# Patient Record
Sex: MALE | Race: ASIAN | Employment: FULL TIME | URBAN - METROPOLITAN AREA
[De-identification: names, ages, dates, MRNs, and addresses within clinical notes are randomized per-mention and may not be internally consistent; named-entity substitution may affect disease eponyms.]

---

## 2017-01-10 NOTE — PROGRESS NOTES
CC:   productive cough with green/brown phlegm     HPI:   Pt has productive cough with green or brown phlegm production for the past several days or more. Patient denies any chest pain shortness of breath or wheezing. Low-grade fever off and on.   Pt has

## 2017-02-21 NOTE — PROGRESS NOTES
Lacey Hester is a 35year old male who presents for a complete physical exam.   HPI:   Pt complains of cough and congestion and a lot of sinus drainage over the past several days or more.   He was treated with Z-Brian for similar symptoms in January but he n and PND  CARDIOVASCULAR: denies CP, palpitations, rapid or slow heart rate.  Denies FRAZIER/lower extremity swelling  GI: denies abdominal pain,denies heartburn, denies n/v/c/d/change in stools/blood in stool/black stool/change in appetite  : denies nocturia insight    There is no immunization history for the selected administration types on file for this patient.     ASSESSMENT AND PLAN:   Steph Hayes is a 35year old male who presents for a complete physical exam.     Recommend healthy diet including green

## 2017-02-27 NOTE — TELEPHONE ENCOUNTER
Pt would prefer not to come again as he has already been seen twice, and [place] city. Pt states that he still has the cough and would like to know if there is something, even OTC, he can take for it.

## 2017-02-27 NOTE — TELEPHONE ENCOUNTER
Pt called and made him aware the he will need a chest xray and he needs to start Flovent 2 puffs per day to help with cough. I told him that meds were sent to the pharmacy and to call to schedule an appt later this week or early next week. .Ned Weiner

## 2017-02-27 NOTE — TELEPHONE ENCOUNTER
Would recommend getting a chest x-ray and starting Flovent 2 puffs twice a day which can help with cough. Meds sent to pharmacy. Patient should follow-up with me towards the end of this week or beginning of next week.

## 2017-03-10 NOTE — PATIENT INSTRUCTIONS
USE MUCINEX DM TWICE A DAY AS DIRECTED ON PACKAGE. USE SYMBICORT INHALER(80/4.5) :   TAKE 2 INHALATIONS TWICE A DAY. FOLLOW UP IN  1 WEEK.

## 2017-03-12 NOTE — PROGRESS NOTES
HPI:    Patient ID: Janel Vargas is a 35year old male. HPI  Patient is here with complaint of cough sore throat. He has finished 2 courses of antibiotic and try Flovent with minimal relief. No fever or chills.   Review of Systems  Negative except for DC#[id number]

## 2017-03-31 NOTE — PROGRESS NOTES
HPI:    Patient ID: Hari Cano is a 35year old male. HPI  Patient is here with complaint of cough congestion started 2 days ago. Previously he was given Symbicort and did respond well to it.   Review of Systems  Negative except for the above tablet 0      Sig: Take 1 tablet (20 mg total) by mouth 2 (two) times daily. Albuterol Sulfate 108 (90 Base) MCG/ACT Inhalation Aerosol Powder, Breath Activated 1 each 0      Sig: Inhale 2 puffs into the lungs every 4 to 6 hours as needed.            I

## 2017-05-06 NOTE — TELEPHONE ENCOUNTER
Patient is requesting vanos cream. He is unable to come in during the week. Would like a refill.   Medication pended to PCP

## 2017-05-08 NOTE — TELEPHONE ENCOUNTER
From: Honey Burden  Sent: 5/6/2017 1:07 PM CDT  To: [facility] Clinical Staff  Subject: RE: Prescription Question    Thank you Wittenberg Roscoe. I just got a call from [facility] that insurance is not covering the medication. [facility] has sent the detail to you.  I hav Prescription Question    I had a referral last year but it . I was not able to use the referral as I used the Vanos cream prescribed by Dr. Dina Alvarez. Do you still want me to take an appointment?  Can you please talk to the doctor and have him refer to

## 2017-05-20 NOTE — PROGRESS NOTES
HPI:    Patient ID: Mariia Horan is a 38year old male. HPI  Is a rash which is scaly in both feet which he has had chronically for years off and on. He has used Vanos cream in the past which did help. He did not use anything recently.   Is getting wo plantar aspect of the right foot and on the dorsum and plantar aspect of the left foot         ASSESSMENT/PLAN:   Psoriasis  (primary encounter diagnosis)  Kidney stone  Cough  Patient counseled at length regarding the above conditions.   We will try clobet

## 2017-06-09 NOTE — TELEPHONE ENCOUNTER
From: Jamelle Frankel  To: Chapis Wayne MD  Sent: 6/3/2017 4:17 PM CDT  Subject: Test Results Question    I have a question about XR ABDOMEN (KUB) (1 AP VIEW) (CPT=74000) resulted on 5/27/17, 1:06 PM.    I was unable to figure out from the report if angel

## 2018-08-21 PROBLEM — N50.819 PAIN OF TESTES: Status: ACTIVE | Noted: 2018-08-21

## 2018-08-21 PROBLEM — R10.30 INGUINAL PAIN: Status: ACTIVE | Noted: 2018-08-21

## 2018-08-21 NOTE — PROGRESS NOTES
HPI:    Patient ID: Lacey Hester is a 37year old male. HPI  Patient is here with complaint of bilateral inguinal pain and also testicular pain over the past several days or more. He states it hurts more when he stands.   He denies any dysuria or penil

## 2018-08-23 NOTE — TELEPHONE ENCOUNTER
Pt wants test results from [address] today, I told him that we do not have them yet and once we do an provider reviews them, the pt will be called

## 2018-08-23 NOTE — TELEPHONE ENCOUNTER
No inguinal hernia is seen. Patient does have what appears to be epididymitis bilateral.  The antibiotic I gave should take care of it. Suggest an infection of the cord like structures behind the testicles. It can happen without any known cause.   [employer]

## 2018-08-24 NOTE — TELEPHONE ENCOUNTER
Patient states he has questions for you and he did not want discuss with the nurse. Patient would like call back when you are available.

## 2018-10-04 NOTE — TELEPHONE ENCOUNTER
Pt would like a meningitis shot for traveling purpose please call pt and inform him how long will it take to get his appt, please advise

## 2018-10-04 NOTE — TELEPHONE ENCOUNTER
Future Appointments   Date Time Provider Leslie Tucker   10/10/2018  1:30 PM [facility] NURSE [facility] [address]

## 2018-10-04 NOTE — TELEPHONE ENCOUNTER
Requesting meningitis vaccine  LOV: 8/21/18  RTC: not noted    No future appointments. No record of receiving this shot. Would like to get for traveling.   Pended for approval [employer]

## 2018-10-10 NOTE — PROGRESS NOTES
IM to left deltoid, patient tolerated well. Waiver of non covered benefit form signed by patient and brought to front for immediate scanning. Dx code/charges documented for this encounter.

## 2018-10-17 NOTE — PROGRESS NOTES
Akil Busby is a 37year old male who presents for a complete physical exam.   HPI:   Pt complains of nothing.   Urination changes no  ED symptoms no  Immunizations needed no  Wt Readings from Last 6 Encounters:  10/17/18 : 164 lb  08/21/18 : 157 lb 8 oz Family History   Problem Relation Age of Onset   • Diabetes Father    • Diabetes Mother       Social History:  Social History    Tobacco Use      Smoking status: Never Smoker      Smokeless tobacco: Never Used    Alcohol use: No    Drug use: No        RE pulses 2+ and symmetric b/l  GI: normoactive BS, non-distended, non-tender to palpation, no HSM/masses/pulsations  MUSCULOSKELETAL: Back with normal AROM, no joint swelling, extremities x 4 with normal strength 5/5 and symmetric and with normal AROM/PROM.

## 2019-01-24 NOTE — PROGRESS NOTES
HPI:   Michelle Alejandra is a 37year old male that presents for cough. Patient states he has had cough for approx. 3 days. He also states he had a low grade temp last night of 99.8. Pt took motrin and tylenol.  He said he cannot sleep cause the cough is sev to auscultation bilterally, no rales/rhonchi/wheezing. ABDOMEN:  Soft, nondistended, nontender, bowel sounds normal in all 4 quadrants, no masses, no hepatosplenomegaly. EXTREMITIES:  No edema, no cyanosis, 2+ radial pulses b/l.    NEURO:  Grossly normal

## 2019-08-01 NOTE — ED PROVIDER NOTES
Patient Seen in: [facility]    History   Patient presents with:  Chest Pain    Stated Complaint: CHEST PAIN X 1 DAY    HPI    This 68-year-old male presents to the office with complaint of left-sided chest pain which started las 6.5\")   Wt 72.6 kg   SpO2 98%   BMI 25.44 kg/m²         Physical Exam    General: WH/WN/WD, in NAD, A and O times 3  HEAD: Normocephalic, atraumatic  EYES: Sclera anicteric,  conjunctiva normal.  EARS: Tympanic membranes normal, EAC's normal.  NOSE: Lexie Leroy MDM     The patient is merely brought back to the room and placed on a cart. EKG is performed. IV access is obtained and laboratory is drawn. I discussed the results of the laboratory, EKG and chest x-ray with the patient and his wife.   He is

## 2019-08-01 NOTE — ED INITIAL ASSESSMENT (HPI)
Patient c/o left sided chest pain started yesterday when bending, lifting something, and with certain movements. Denies short of breath. States recent long drive to [place] July 21, 2019 and back to [place] July 28, 2019.

## 2019-08-01 NOTE — TELEPHONE ENCOUNTER
Pt called in with c/o left chest pain, on and off, experiences it with some movements and when presses his chest. He first noticed it last evening when he bent over to pick something up.  He reports he noticed it last night when changing positions to his si

## 2019-08-01 NOTE — ED NOTES
Pt returned our call. States he has already been worked up by hematologist, found no actionable issue. No further questions at this time.

## 2019-08-01 NOTE — ED NOTES
Phone call placed to pt's phone, message left on voice mail asking pt to please return our call. Return phone number provided.  (Per Dr. Arian Olmos pt should follow up with PCP for iron deficiency anemia / hemoglobinopathy)

## 2019-08-05 NOTE — TELEPHONE ENCOUNTER
Called pt, pt reports he has been taking either Ibuprofen or Tylenol for the fever and it is responding to the medication.  Pt requesting appointment today or asking Dr. Chon Almaguer to fit him in, held pt on phone with questions, but once appointment made, pt ea

## 2019-08-08 NOTE — PROGRESS NOTES
HPI:   Hari Cano is a 53year old male that presents for Patient presents with:  Fever: Pain stated he is having sever throat pain/fever/headache/body ache since yesterday. He has been taking advil/tynelol for his symptoms.         Past medical, surgica no cervical LAD, no thyromegaly. SKIN: No rashes, no skin lesion, no bruising, good turgor. HEART:  Regular rate and rhythm, no murmurs, rubs or gallops. LUNGS: Clear to auscultation bilterally, no rales/rhonchi/wheezing.   ABDOMEN:  Soft, nondistended,

## 2019-11-12 NOTE — TELEPHONE ENCOUNTER
Pt is experiencing pain between his neck and shoulder on the right side. Pt is not sure if he needs an appt but he would like to speak to a nurse.

## 2019-12-04 NOTE — PROGRESS NOTES
Arjun Obregon is a 53year old male who presents with son for a complete physical exam.   HPI:   Urination changes no  ED symptoms no  Immunizations needed: refuses flu vaccine today. The patient complains of excessive ear wax build up in both ears. 25.4 %    Mixed Cell % 10.4 %   D-DIMER (POC)   Result Value Ref Range    D-Dimer DDU <100 <400 ng/mL DDU   EKG 12-LEAD   Result Value Ref Range    Ventricular rate 84 BPM    Atrial rate 84 BPM    P-R Interval 138 ms    QRS Duration 88 ms    Q-T Interval 3 urethra  MUSCULOSKELETAL: denies joint or muscle aches or pains  NEURO: denies headaches/dizziness  PSYCH: denies depression or anxiety  HEMATOLOGIC: denies easy bruising/bleeding/anemia/blood clot disorders  ENDOCRINE: denies weight gain/weight loss/enlar Janel Vargas is a 53year old male who presents for a complete physical exam.     José Gaspar was seen today for physical, imm/inj and ear wax.     Diagnoses and all orders for this visit:    Routine general medical examination at a health care facility  - Signed: Bouchra Aponte, 12/4/2019, 3:24 PM.      I, Cordell Lara MD,  personally performed the services described in this documentation. All medical record entries made by the scribe were at my direction and in my presence.   I have reviewed the tony

## 2019-12-12 NOTE — TELEPHONE ENCOUNTER
Informed pt of Dr. Elizabeth Dietz update, advised pt the form was complete, only needs Dr. Elizabeth Dietz signature then can be faxed to fax number on phone. Advised can be done tomorrow morning, nurse will call pt when signed and faxed with confirmation received.  Pt

## 2019-12-13 NOTE — TELEPHONE ENCOUNTER
Form reviewed and signed per Dr. Lemuel Saenz, faxed to number provided on form with confirmation received. Pt informed, pt expressed understanding and agreement, form and copy of confirmation at  ready for . Sent copy to scan. Task completed.

## 2019-12-22 PROBLEM — H61.23 BILATERAL IMPACTED CERUMEN: Status: ACTIVE | Noted: 2019-12-22

## 2019-12-22 NOTE — PROGRESS NOTES
HPI:   Carlos Menendez is a 53year old male that presents for Patient presents with:  Lab Results  Ear Wax: Kevin ear wax- Patient is still having discomfort on both ears Has been trying ear wax/hydrogen peroxide ear drops over the counter.          Past medic of both feet extensive. HEART:  Regular rate and rhythm, no murmurs, rubs or gallops. LUNGS: Clear to auscultation bilterally, no rales/rhonchi/wheezing.   ABDOMEN:  Soft, nondistended, nontender, bowel sounds normal in all 4 quadrants, no masses, no hepa

## 2020-11-16 PROBLEM — N50.819 PAIN OF TESTES: Status: RESOLVED | Noted: 2018-08-21 | Resolved: 2020-11-16

## 2020-11-16 PROBLEM — R10.30 INGUINAL PAIN: Status: RESOLVED | Noted: 2018-08-21 | Resolved: 2020-11-16

## 2020-11-16 NOTE — PROGRESS NOTES
Maribeth Walters is a 50year old male who presents for a complete physical exam.   HPI:   Pt complains of nothing.   Urination changes no  ED symptoms no  Immunizations needed no  Wt Readings from Last 6 Encounters:  11/16/20 : 180 lb 4 oz (81.8 kg)  12/20/19 anisopoikilocytosis. Adequate number of neutrophils and platelets. Reviewed by Jordan Mays M.D.  Pathology 12/09/19 at 2:55 PM       CBC W/ DIFFERENTIAL   Result Value Ref Range    WBC 8.5 4.0 - 11.0 x10(3) uL    RBC 6.94 (H) 4.30 - 5.70 x10(6)uL    H unusual skin lesions  EYES: denies changes in vision  HENT: denies upper respiratory symptoms  LUNGS: denies RENETTA, wheezing, cough, orthopnea and PND  CARDIOVASCULAR: denies CP, palpitations, rapid or slow heart rate.  Denies FRAZIER/lower extremity swelling  GI biceps and patellar 2+ b/l and symmetric.  Gait is normal.  PSYCH: normal affect, no apparent thought disorder, average judgement and insight      Immunization History  Administered            Date(s) Administered    Fluvirin, 3 Years & >, Im

## 2020-11-19 NOTE — TELEPHONE ENCOUNTER
Patient came into the office with a phyiscal for from [address] [place] [address], wants it faxed when it's complete & a phone call.

## 2020-11-23 NOTE — TELEPHONE ENCOUNTER
From: Esther Landis  To: Raquel Ortiz MD  Sent: 11/23/2020 1:23 PM CST  Subject: Other    Hello,    Did you get a chance to fax the form?     Thanks,  Caprice Morillo

## 2020-11-24 NOTE — TELEPHONE ENCOUNTER
Pt called asking the status. Form found in MA folder. Needed waist circumference. Per Francis Castaneda, pt can come in and we can take his measurement. Pt came around noon and measurement was taken, form was signed and faxed.     Fax confirmed, copy given to pt,

## 2021-03-25 NOTE — PROGRESS NOTES
HPI:   Janel Vargas is a 50year old male that presents for   Patient presents with:  Knee Pain: Right knee pain x few months, no pain in left knee. Sitting for long periods-unable to straighten leg. No previous injury, no trauma.  No imaging    Patient st tenderness gait is normal  EXTREMITIES:  No edema, no cyanosis, 2+ radial pulses b/l. NEURO:  Grossly normal     ASSESSMENT AND PLAN:      1. Acute pain of right knee  - XR KNEE (1 OR 2 VIEWS), RIGHT (CPT=73560);  Future    I would recommend x-ray of knee

## 2021-12-12 NOTE — PROGRESS NOTES
Leone Scheuermann is a 48year old male who presents for a complete physical exam.   HPI:   Patient presents with:  Physical: PHQ2: 0, CSSR: Neg   Immunization/Injection: declined flu vaccine today  Other: due for colonoscopy -> referral pended      Urination PND  CARDIOVASCULAR: denies CP, palpitations, rapid or slow heart rate.  Denies FRAZIER/lower extremity swelling  GI: denies abdominal pain,denies heartburn, denies n/v/c/d/change in stools/blood in stool/black stool/change in appetite  : denies nocturia or c History  Administered            Date(s) Administered    Covid-19 Vaccine Moderna 100 mcg/0.5 ml                          03/11/2021 04/08/2021 12/05/2021      Fluvirin, 3 Years & >, Im                          12/24/2012      Meningococcal-Menactra A1C [E]      Gastro Referral - In Network      ENT Referral - In [facility]

## 2022-05-07 ENCOUNTER — HOSPITAL ENCOUNTER (OUTPATIENT)
Age: 47
Discharge: HOME | End: 2022-05-07
Attending: EMERGENCY MEDICINE
Payer: COMMERCIAL

## 2022-05-07 VITALS
BODY MASS INDEX: 27.32 KG/M2 | DIASTOLIC BLOOD PRESSURE: 86 MMHG | TEMPERATURE: 98 F | HEIGHT: 66 IN | HEART RATE: 114 BPM | RESPIRATION RATE: 20 BRPM | SYSTOLIC BLOOD PRESSURE: 143 MMHG | WEIGHT: 170 LBS | OXYGEN SATURATION: 99 %

## 2022-05-07 DIAGNOSIS — U07.1 COVID-19: Primary | ICD-10-CM

## 2022-05-07 LAB
POCT INFLUENZA A: NEGATIVE
POCT INFLUENZA B: NEGATIVE
S PYO AG THROAT QL: NEGATIVE
SARS-COV-2 RNA RESP QL NAA+PROBE: DETECTED

## 2022-05-07 PROCEDURE — 87502 INFLUENZA DNA AMP PROBE: CPT | Performed by: EMERGENCY MEDICINE

## 2022-05-07 PROCEDURE — 99213 OFFICE O/P EST LOW 20 MIN: CPT

## 2022-05-07 PROCEDURE — 87880 STREP A ASSAY W/OPTIC: CPT

## 2022-11-01 NOTE — TELEPHONE ENCOUNTER
Future Appointments   Date Time Provider Leslie Tucker   11/7/2022 11:40 AM Kaden Toth MD [facility] [address]     Labs ordered. Patient has been notified via mychart reminder her to have her labs done 1-2 days prior to appt.

## 2022-11-14 NOTE — TELEPHONE ENCOUNTER
Shilpa Mercado Pt is calling to ask that the referrals placed on 11/07/2022 be re submitted as the insurance was in the patients chart as a ppo. Updated insurance as HMO. Pt had been seen at the lab and the insurance was put in incorrectly on 11/05/2022.

## 2023-02-15 NOTE — TELEPHONE ENCOUNTER
Received colonoscopy report from [facility], Dr. Bibiana Castro. Report has been abstracted. HM updated.

## 2023-11-10 NOTE — PROGRESS NOTES
HPI:   Elis Catalan is a 48year old male that presents for Patient presents with:  Knee Pain: f/u knee pain, office visit 03/24/2021. Ordered xray knee-normal, finshed course of steriod. Knee pain has slightly improved Has a knee brace.  No other imaging Tylenol ibuprofen as needed. Since he is getting better will hold off on physical therapy. Risks, benefits, and alternatives of current treatment plan discussed in detail. Questions and concerns addressed. Red flags to RTC or ED reviewed.   Patient (or p negative soft/nontender/nondistended/normal active bowel sounds

## 2023-11-13 NOTE — TELEPHONE ENCOUNTER
Patient came into the office wanting to wait for a physical form to be completed. He was told he could  a copy tomorrow, his wife will get it at the end of the day.  He would like the form from [address] [place] [address] faxed to the number on the bottom of the form a Full

## 2024-08-30 NOTE — ED INITIAL ASSESSMENT (HPI)
Cough and congestion for 2 weeks, no fever . SO was sick he wasn't concerned but it is lasting longer , nasal discharge

## 2024-08-30 NOTE — ED PROVIDER NOTES
Patient Seen in: [facility]      History     Chief Complaint   Patient presents with    Cough/URI     Stated Complaint: cough, cold, stuffy nose    Subjective:   HPI    49-year-old male here with complaint of a 2 to 3-week history of runny nose nasal congestion and now productive cough.  Patient denies chest pain, shortness of breath, abdominal pain, nausea, vomiting or diarrhea.  Patient is tolerating p.o. speaking full sentences.  Afebrile.    Objective:   Past Medical History:    Inguinal pain    Low vitamin D level    Pain of testes              History reviewed. No pertinent surgical history.           The patient's medication list, past medical history and social history elements  as listed in today's nurse's notes are reviewed and agree.   The patient's family history is reviewed and is noncontributory to the presenting problem, except as indicated as above.     Social History     Socioeconomic History    Marital status:    Tobacco Use    Smoking status: Never    Smokeless tobacco: Never   Vaping Use    Vaping status: Never Used   Substance and Sexual Activity    Alcohol use: No    Drug use: No              Review of Systems    Positive for stated Chief Complaint: Cough/URI    Other systems are as noted in HPI.  Constitutional and vital signs reviewed.      All other systems reviewed and negative except as noted above.    Physical Exam     ED Triage Vitals [08/30/24 1754]   /90   Pulse 96   Resp 16   Temp 98 °F (36.7 °C)   Temp src Oral   SpO2 96 %   O2 Device None (Room air)       Current Vitals:   Vital Signs  BP: 142/90  Pulse: 96  Resp: 16  Temp: 98 °F (36.7 °C)  Temp src: Oral    Oxygen Therapy  SpO2: 96 %  O2 Device: None (Room air)            Physical Exam  Vitals and nursing note reviewed.   Constitutional:       Appearance: Normal appearance. He is well-developed.   HENT:      Head: Normocephalic.      Jaw: There is normal jaw occlusion.      Right Ear: Tympanic  membrane and external ear normal.      Left Ear: Tympanic membrane and external ear normal.      Nose: Mucosal edema, congestion and rhinorrhea present. Rhinorrhea is clear.      Mouth/Throat:      Lips: Pink.      Mouth: Mucous membranes are moist.      Pharynx: Oropharynx is clear.      Comments: Uvula midline: No trismus or drooling: No peritonsillar abscess noted moderate cobblestoning in the posterior pharynx  Eyes:      Conjunctiva/sclera: Conjunctivae normal.      Pupils: Pupils are equal, round, and reactive to light.   Cardiovascular:      Rate and Rhythm: Normal rate and regular rhythm.      Heart sounds: Normal heart sounds.   Pulmonary:      Effort: Pulmonary effort is normal.      Breath sounds: Rhonchi present.      Comments: mild expiratory wheeze  Musculoskeletal:      Cervical back: Normal range of motion and neck supple.   Skin:     General: Skin is warm.      Capillary Refill: Capillary refill takes less than 2 seconds.   Neurological:      General: No focal deficit present.      Mental Status: He is alert and oriented to person, place, and time.   Psychiatric:         Mood and Affect: Mood normal.         Behavior: Behavior normal.         Thought Content: Thought content normal.         Judgment: Judgment normal.             ED Course     XR CHEST PA + LAT CHEST (CPT=71046)    Result Date: 8/30/2024  PROCEDURE:  XR CHEST PA + LAT CHEST (CPT=71046)  INDICATIONS:  cough, cold, stuffy nose  COMPARISON:  VITO SERRATO, XR CHEST PA + LAT CHEST (CPT=71046), 8/01/2019, 1:41 PM.  TECHNIQUE:  PA and lateral chest radiographs were obtained.  PATIENT STATED HISTORY: (As transcribed by Technologist)  Patient states that he has a dry cough and nasal congestion for two weeks.    FINDINGS:  LUNGS:  On the frontal view, 17 x 9 mm opacity not present previously projects over the right upper lobe.  Bronchial wall thickening present, consider bronchitis.  No focal pneumonia.  No other focal or diffuse pulmonary  parenchymal findings. CARDIAC:  Normal size cardiac silhouette. MEDIASTINUM:  Normal. PLEURA:  Normal.  No pleural effusions. BONES:  Normal for age.            CONCLUSION:  17 x 9 mm opacity projecting over the right upper lobe.  Nonspecific, could reflect developing mass lesion, focal inflammation, but needs follow-up.  Correlate with smoking history, and suggest a non emergency outpatient CT of the chest for further evaluation of this finding.  Bronchial wall thickening may reflect bronchitis.  Otherwise, no potential active process identified.   LOCATION:  [facility]   Dictated by (CST): Nathan Fernandez MD on 8/30/2024 at 6:34 PM     Finalized by (CST): Nathan Fernandez MD on 8/30/2024 at 6:37 PM           NOTE: Patient denies any chest tightness and defers any inhaler.  We will treat with antibiotics since it has been going on this long.  Patient did have expiratory wheeze with some wet sounding rhonchi we will give some additional prednisone.  Patient is aware of the abnormal chest x-ray findings.  The message was sent to his PCP and he will also contact him to get an outpatient CT.           MDM     Clinical Impression: productive cough/PND/abnormal chest xray findings  Course of Treatment:   The decadron will work in your system the next several days.  You may start the additional prednisone on day 2 or 3 if symptoms persist.  Take the full course of antibiotics as prescribed.  Recommend taking an over the counter antihistamine daily: IE zyrtec/claritin.  Sleep more upright. Use chloraseptic spray to help stop the cough trigger reflex.  Push fluids and gargle with warm saline rinses.   NOTE: Contact your PCP as we discussed to get an outpatient order for CT chest imaging.    The patient is encouraged to return if any concerning symptoms arise. Additional verbal discharge instructions are given and discussed. Discharge medications are discussed. The patient is in good condition throughout the visit today and  remains so upon discharge. I discuss the plan of care with the patient, who expresses understanding. All questions and concerns are addressed to the patient's satisfaction prior to discharge today.  Previous conversations with PCP and charts were reviewed.                                           Disposition and Plan     Clinical Impression:  1. Productive cough    2. PND (post-nasal drip)    3. Abnormal finding on chest xray         Disposition:  Discharge  8/30/2024  7:04 pm    Follow-up:  Arian Agosto MD  [address] [place] [address]  [phone]                Medications Prescribed:  Current Discharge Medication List        START taking these medications    Details   azithromycin (ZITHROMAX Z-MARLENY) 250 MG Oral Tab 500 mg once followed by 250 mg daily x 4 days  Qty: 6 tablet, Refills: 0      predniSONE 20 MG Oral Tab Take 2 tablets (40 mg total) by mouth daily for 3 days. Start on day 2-3 if symptoms persist  Qty: 6 tablet, Refills: 0

## 2024-08-31 NOTE — DISCHARGE INSTRUCTIONS
Please return to the ER/clinic if symptoms worsen. Follow-up with your PCP in 24-48 hours as needed.    The decadron will work in your system the next several days.  You may start the additional prednisone on day 2 or 3 if symptoms persist.  Take the full course of antibiotics as prescribed.  Recommend taking an over the counter antihistamine daily: IE zyrtec/claritin.  Sleep more upright. Use chloraseptic spray to help stop the cough trigger reflex.  Push fluids and gargle with warm saline rinses.   NOTE: Contact your PCP as we discussed to get an outpatient order for CT chest imaging.

## 2024-09-03 NOTE — TELEPHONE ENCOUNTER
Patient went to IC on 8-.    Patient got an xray that showed the following.  CONCLUSION:  17 x 9 mm opacity projecting over the right upper lobe.  Nonspecific, could reflect developing mass lesion, focal inflammation, but needs follow-up.  Correlate with smoking history, and suggest a non emergency outpatient CT of the chest for  further evaluation of this finding.  Bronchial wall thickening may reflect bronchitis.  Otherwise, no potential active process identified.        LOCATION:  [id number]        Dictated by (CST): Nathan Fernandez MD on 8/30/2024 at 6:34 PM      Finalized by (CST): Nathan Fernandez MD on 8/30/2024 at 6:37 PM        Patient would like to know if  has reviewed these findings and if he will be ordering a CT scan for patient.    Patient is aware that Dr Agosto is not in office today.

## 2024-09-04 NOTE — TELEPHONE ENCOUNTER
Patient called stating that he been to the immediate care and the gave him some antibiotic and he took all them and the cough will not go away the also did a X-Ray and found something abnormal in the X-Ray. Patient is asking if Dr. MARTINEZ can squeeze him within the next couple of days. If Dr. MARTINEZ can see him please give patient a call [phone].    Patient said his cough is not getting any better at all..    Please advise

## 2024-09-05 NOTE — TELEPHONE ENCOUNTER
Pt states  productive cough not getting any better, no SOB, no fever, no ST.   Took 3 days of prednisone and finished zpak from [facility]  Has CT scheduled 9/12/24    No available openings, is there any other medication pt can take? Another steroid/antibiotic?

## 2024-09-13 NOTE — TELEPHONE ENCOUNTER
Patient had CT completed yesterday and received result through [employer].  He wanted doctor to place order for PET-CT.    Patient also wanted doctor to know that his cough is getting better, he has two days left on antibiotic, but stated that when he starts talking it triggers a coughing episode.    Please advise.

## 2024-09-13 NOTE — TELEPHONE ENCOUNTER
I will place an order, I don't order PET scans often, and if not ok'd with insurance may need to see a specialist, can we confirm with radiology recommended test is as I ordered    Arian Agosto MD

## 2024-09-13 NOTE — TELEPHONE ENCOUNTER
, patient has already scheduled PET scan on 09/19/24.  He is now requesting prescription strength cough medication to be called to [facility] pharmacy at [address]. States cough is exacerbated while speaking at work and this makes it difficult to verbally communicate without interruption.   Also, he is requesting referral to oncology just in case he should need this. He is concerned that specialists tend to be booked far into future.

## 2024-09-13 NOTE — TELEPHONE ENCOUNTER
From: Joy Sweeney  To: Arian Agosto  Sent: 9/13/2024 12:31 PM CDT  Subject: CT-Scan Result    Hello Doctor,    Can you please review the CT-Scan result and let me know the next steps?    Thanks,  Joy

## 2024-09-13 NOTE — TELEPHONE ENCOUNTER
Spoke to patient - he is aware of PET scan order. Has update on Cough:  Has itchy throat, possible wheezing. Coughing while on the phone. Day 9 of ABX. Asking if he needs another abx or some other test. Had CT chest 9/12.

## 2024-09-13 NOTE — TELEPHONE ENCOUNTER
I think we get the test, and plan a visit, I haven't actually examined him for this yet.    Arian Agosto MD

## 2024-09-13 NOTE — TELEPHONE ENCOUNTER
Will send in cough medicine, but I'd see if we can get that imaging before I involve oncology, more likely pulmonology at the moment, but I've not actually seen Mr. Sweeney for this concern, it's been 10 months since I last saw him. Haven't examined him or anything.    Arian Agosto MD

## 2024-09-19 NOTE — TELEPHONE ENCOUNTER
From: Joy Sweeney  To: Arian Agosto  Sent: 9/19/2024 3:21 PM CDT  Subject: PET-CT scan result    Helarie Agosto,    Sorry to bother you. I read the result and based on limited understanding, result doesn’t look good. I am very worried and would like to talk to you as soon as possible. I know, I have a follow-up appointment on Monday. I would greatly appreciate, If you can call me tomorrow to answer some of my question, so I can have some peace over the weekend. Thanks for your understanding.    Best Regards,  Joy

## 2024-09-19 NOTE — TELEPHONE ENCOUNTER
Mynor Serrano  [facility]  [address]  [facility] [address] [phone]   Patient called, he wanted both referrals entered.

## 2024-09-19 NOTE — TELEPHONE ENCOUNTER
Pt has appt on Monday to go over test results with RH.  He wants to talk to someone before.  He is very worrie

## 2024-10-08 NOTE — PROGRESS NOTES
Subjective:   Patient ID: Joy Sweeney is a 49 year old male.    Cough x 7-8 weeks.  Initially seen in IC. Was given dexamethason, prednisone and zpak.  Not better so given augmentin.  Its much better, but still coughing when talking and voice change.  No F/C. No CP/SOB.  Hx of wheezing as child.        History/Other:   Review of Systems   All other systems reviewed and are negative.    Current Outpatient Medications   Medication Sig Dispense Refill    predniSONE 20 MG Oral Tab 2.5 tab day 1-3, 2 tab day 4, 1.5 tab day 5, 1 tab day 6, 0.5 tab day 7 13 tablet 0    pantoprazole 40 MG Oral Tab EC 30 mins before meal twice daily x 10 days, then once daily x 10 days. 30 tablet 0    clobetasol 0.05 % External Ointment Apply to AA's of feet BID x 3 weeks then twice a week as maintenance. Re-peat PRN. 120 g 3    loratadine 10 MG Oral Tab Take 1 tablet (10 mg total) by mouth as needed for Allergies.       Allergies:No Known Allergies    Objective:   Physical Exam  Vitals reviewed.   Constitutional:       General: He is not in acute distress.     Appearance: He is well-developed. He is not diaphoretic.   HENT:      Right Ear: External ear normal.      Left Ear: External ear normal.      Nose: No congestion or rhinorrhea.      Mouth/Throat:      Pharynx: No oropharyngeal exudate or posterior oropharyngeal erythema.   Eyes:      General: No scleral icterus.        Right eye: No discharge.         Left eye: No discharge.      Conjunctiva/sclera: Conjunctivae normal.   Neck:      Thyroid: No thyromegaly.   Cardiovascular:      Rate and Rhythm: Normal rate and regular rhythm.      Heart sounds: Normal heart sounds.   Pulmonary:      Effort: Pulmonary effort is normal. No respiratory distress.      Breath sounds: Normal breath sounds. No wheezing or rales.   Musculoskeletal:      Cervical back: Normal range of motion and neck supple.   Lymphadenopathy:      Cervical: No cervical adenopathy.       Assessment & Plan:   1. Chronic  cough      1. Chronic cough  - predniSONE 20 MG Oral Tab; 2.5 tab day 1-3, 2 tab day 4, 1.5 tab day 5, 1 tab day 6, 0.5 tab day 7  Dispense: 13 tablet; Refill: 0  - pantoprazole 40 MG Oral Tab EC; 30 mins before meal twice daily x 10 days, then once daily x 10 days.  Dispense: 30 tablet; Refill: 0      Meds This Visit:  Requested Prescriptions     Signed Prescriptions Disp Refills    predniSONE 20 MG Oral Tab 13 tablet 0     Si.5 tab day 1-3, 2 tab day 4, 1.5 tab day 5, 1 tab day 6, 0.5 tab day 7    pantoprazole 40 MG Oral Tab EC 30 tablet 0     Si mins before meal twice daily x 10 days, then once daily x 10 days.     Imaging & Referrals:  None

## 2024-10-21 NOTE — PROGRESS NOTES
HPI:   Joy Sweeney is a 49 year old male who presents for an Annual Health Visit.     Here for wellness visit.    Had a cough, mostly better after second antibiotic, and still some issues with talking and cough.    Saw pulmonology and thought likely infectious and planned 3 month CT follow up    Saw Dr. Foster due to ongoing cough, and did a course of steroids and PPI, and now cough has resolved.    No changes in life or work. Stable.    Generally no issues with constipation, but sometimes last few days, feels like passing stool feels a lump.          Allergies:   No Known Allergies    CURRENT MEDICATIONS   Current Outpatient Medications   Medication Sig Dispense Refill    pantoprazole 40 MG Oral Tab EC 30 mins before meal twice daily x 10 days, then once daily x 10 days. 30 tablet 0    clobetasol 0.05 % External Ointment Apply to AA's of feet BID x 3 weeks then twice a week as maintenance. Re-peat PRN. 120 g 3    loratadine 10 MG Oral Tab Take 1 tablet (10 mg total) by mouth as needed for Allergies.        HISTORICAL INFORMATION   Past Medical History:    Inguinal pain    Low vitamin D level    Pain of testes      No past surgical history on file.   Family History   Problem Relation Age of Onset    Diabetes Father     Diabetes Mother       SOCIAL HISTORY   Social History     Socioeconomic History    Marital status:    Tobacco Use    Smoking status: Never    Smokeless tobacco: Never   Vaping Use    Vaping status: Never Used   Substance and Sexual Activity    Alcohol use: No    Drug use: No     Social History     Social History Narrative    Not on file        REVIEW OF SYSTEMS:     Constitutional: negative  Eyes: negative  ENT: negative  Respiratory: negative  Cardiovascular: negative  Gastrointestinal: negative  Integument/Breast: negative  Genitourinary: negative  Heme/Lymph: negative  Musculoskeletal: negative  Neurological: negative  Psych: negative  Endocrine: negative  Allergic/Immune:  negative    EXAM:   /76   Pulse 86   Resp 18   Ht 5' 6\" (1.676 m)   Wt 174 lb (78.9 kg)   SpO2 99%   BMI 28.08 kg/m²    Wt Readings from Last 6 Encounters:   10/21/24 174 lb (78.9 kg)   10/08/24 174 lb (78.9 kg)   08/30/24 175 lb (79.4 kg)   11/15/23 180 lb (81.6 kg)   11/11/23 180 lb (81.6 kg)   11/07/22 180 lb 8 oz (81.9 kg)     Body mass index is 28.08 kg/m².    General: alert, appears stated age, and cooperative  Head: Normocephalic, without obvious abnormality, atraumatic  Eyes: conjunctivae/corneas clear. PERRL, EOM's intact. Fundi benign.  Ears: normal TM's and external ear canals both ears  Nose: Nares normal. Septum midline. Mucosa normal. No drainage or sinus tenderness.  Throat: lips, mucosa, and tongue normal; teeth and gums normal  Neck: no adenopathy, no carotid bruit, no JVD, supple, symmetrical, trachea midline, and thyroid not enlarged, symmetric, no tenderness/mass/nodules  Heart: S1, S2 normal, no murmur, click, rub or gallop, regular rate and rhythm  Lungs: clear to auscultation bilaterally  Chest wall: no tenderness  Abdomen: soft, non-tender; bowel sounds normal; no masses,  no organomegaly  : deferred  Back: symmetric, no curvature. ROM normal. No CVA tenderness.  Extremities: extremities normal, atraumatic, no cyanosis or edema  Pulses: 2+ and symmetric  Skin: Skin color, texture, turgor normal. No rashes or lesions  Lymph Nodes: Cervical, supraclavicular, and axillary nodes normal.  Neurologic: Grossly normal    ASSESSMENT AND PLAN:   Joy was seen today for physical.    Diagnoses and all orders for this visit:    Wellness examination  -     Comp Metabolic Panel (14); Future  -     CBC With Differential With Platelet; Future  -     Lipid Panel; Future  -     Hemoglobin A1C; Future    Elevated glucose  -     Hemoglobin A1C; Future    Lipid screening  -     Lipid Panel; Future    Prostate cancer screening  -     PSA Screen; Future    Screening for deficiency anemia  -     CBC  With Differential With Platelet; Future    Vitamin D deficiency  -     Vitamin D [E]; Future    Prediabetes    Persistent cough  -     C-Reactive Protein [E]; Future    Need for meningitis vaccination  -     Menveo - Meningococcal 10-55 years [71007]    Other orders  -     Cancel: Fluzone trivalent vaccine, PF 0.5mL, 6mo+ (72543)    Overall doing well, will update vaccines for travel and prevention  There are no Patient Instructions on file for this visit.    The patient indicates understanding of these issues and agrees to the plan.    Problem List:  Patient Active Problem List   Diagnosis    Psoriasis    Vitamin D deficiency    Anemia    Beta thalassemia trait    Bilateral impacted cerumen       Arian Agosto MD  10/21/2024  6:05 PM

## 2025-01-20 NOTE — DISCHARGE INSTRUCTIONS
Tylenol or Advil for fever.    Make sure that you are drinking plenty of fluids.    Go to the emergency department for any new or worsening symptoms.

## 2025-01-20 NOTE — ED PROVIDER NOTES
Patient Seen in: [facility]      History     Chief Complaint   Patient presents with    Cough/URI     Stated Complaint: Fever    Subjective:   HPI      49-year-old male comes to the immediate care for evaluation of a 2-day history of fevers as high as 102 degrees associated with sore throat and generalized muscle aches.  No vomiting or diarrhea.  No cough.  Younger family member has been ill at home without a definitive diagnosis.    Objective:     Past Medical History:    Inguinal pain    Low vitamin D level    Pain of testes              History reviewed. No pertinent surgical history.             Social History     Socioeconomic History    Marital status:    Tobacco Use    Smoking status: Never    Smokeless tobacco: Never   Vaping Use    Vaping status: Never Used   Substance and Sexual Activity    Alcohol use: No    Drug use: No              Review of Systems    Positive for stated complaint: Fever  Other systems are as noted in HPI.  Constitutional and vital signs reviewed.      All other systems reviewed and negative except as noted above.    Physical Exam     ED Triage Vitals [01/20/25 1409]   BP (!) 185/109   Pulse (!) 136   Resp 20   Temp 99.7 °F (37.6 °C)   Temp src Oral   SpO2 98 %   O2 Device None (Room air)       Current Vitals:   Vital Signs  BP: (!) 160/92  Pulse: (!) 136  Resp: 20  Temp: 99.7 °F (37.6 °C)  Temp src: Oral    Oxygen Therapy  SpO2: 98 %  O2 Device: None (Room air)        Physical Exam     General Appearance: This is a middle-aged male lying on a gurney.  Vital signs were reviewed per nurses notes.  Axillary temperature is 99.8.  Monitor reveals a sinus tachycardia rate of 1 20-1 30.  Pulse oximetry is 98% on room air.  Respirations are 20 and unlabored.  Blood pressure is 160/92.  HEENT: Normocephalic/atraumatic.  Anicteric sclera.  Oral mucosa is moist.  Oropharynx is minimally injected without exudates.  Neck: No adenopathy or thyromegaly.  No hoarseness or  stridor.  Lungs are clear to auscultation.  Heart exam: Normal S1-S2 without extra sounds or murmurs.  Regular rate and rhythm.  Abdomen is nontender.  Skin is dry without rashes or lesions.  Extremities are atraumatic.  Neuroexam: Awake, conversive and moving all 4 extremities well.  ED Course     Labs Reviewed   RAPID STREP A - Abnormal; Notable for the following components:       Result Value    Strep A by PCR Positive (*)     All other components within normal limits   POCT FLU TEST - Normal    Narrative:     This assay is a rapid molecular in vitro test utilizing nucleic acid amplification of influenza A and B viral RNA.   RAPID SARS-COV-2 BY PCR - Normal            Acetaminophen was given for fever.    Soft tissue x-rays of the neck were obtained and showed no evidence of epiglottitis.  I viewed the films myself.    Test results and treatment plan were discussed with the patient and family members.     MDM      #1.  Fever and throat pain secondary to strep pharyngitis.  Strep positive.  COVID and flu negative.  No evidence of epiglottitis on x-ray.  Discharged home on antipyretics and antibiotics.        Medical Decision Making      Disposition and Plan     Clinical Impression:  1. Strep pharyngitis    2. Elevated blood pressure reading         Disposition:  Discharge  1/20/2025  3:19 pm    Follow-up:  Arian Agosto MD  [address] [place] [address]  [address] [place] [id number]  [phone]    Call   As needed          Medications Prescribed:  Current Discharge Medication List        START taking these medications    Details   amoxicillin 875 MG Oral Tab Take 1 tablet (875 mg total) by mouth 2 (two) times daily for 10 days.  Qty: 20 tablet, Refills: 0                 Supplementary Documentation:

## 2025-01-24 NOTE — PROGRESS NOTES
[facility] Progress Note    SUBJECTIVE: Joy Sweeney 49 year old male is here today for   Chief Complaint   Patient presents with    Blood Pressure       Has been taking his blood pressure, his son was sick, and had sore throat, and then he developed it Sunday evening, took advil, and then worsened, and went to urgent care and found high heart rate, high BP, and found strep throat.    Started antibiotics.     Pulse came down and fever came down.    BP remained up, and has never been an issue for him in the past.    Had a good calcium score    Has been following a nodule, and PET scan, CT scan, though lung nodule imrpoved.    PET scan in 8 weeks,         PMH  Past Medical History:    Inguinal pain    Low vitamin D level    Pain of testes        PSH  History reviewed. No pertinent surgical history.     Social Hx:  Life is overall stable    ROS  See HPI    OBJECTIVE:  /88   Pulse 102   Resp 16   Ht 5' 6\" (1.676 m)   Wt 173 lb (78.5 kg)   SpO2 98%   BMI 27.92 kg/m²           Labs:          Meds:   Current Outpatient Medications   Medication Sig Dispense Refill    amoxicillin 875 MG Oral Tab Take 1 tablet (875 mg total) by mouth 2 (two) times daily for 10 days. 20 tablet 0    clobetasol 0.05 % External Ointment Apply to AA's of feet BID x 3 weeks then twice a week as maintenance. Re-peat PRN. 120 g 3    loratadine 10 MG Oral Tab Take 1 tablet (10 mg total) by mouth as needed for Allergies.           Assessment/Plan  Joy was seen today for blood pressure.    Diagnoses and all orders for this visit:    Elevated blood pressure reading  -     Comp Metabolic Panel (14) [E]; Future         Plan on rechecking labs, watching blood pressure over coming weeks, to bring machine in for nurse visit to compare.        Total Time spent with patient and coordinating care:  15 minutes.    Follow up: as noted      Arian Agosto MD

## 2025-02-07 NOTE — PROGRESS NOTES
Could consider starting antihypertensive, such as losartan 25 mg, and following BP, vs pushing increased exercise, aiming for cardio exercise 20-30 minutes 5 days a week    Arian Agosto MD

## 2025-05-27 NOTE — TELEPHONE ENCOUNTER
Patient reports Cold symptoms for 3 weeks, is concerned because its not going away. Had cough and fever past 3-4 days. Would like to see RH this week. Please advise. I did advise the patient that we do not have any openings.

## 2025-05-27 NOTE — TELEPHONE ENCOUNTER
Pt was offered appt today, however by the time he responded, it had been taken .    Pt states x 3 weeks has cold sx/allergies. Not resolving with otc meds.  Over weekend had fever which now has subsided  No available appts this week, advised [facility], pt requested appt with only Dr. Agosto, appt made next Monday, but again advised [facility] if symptoms persist

## 2025-06-02 NOTE — PROGRESS NOTES
[facility] Progress Note    SUBJECTIVE: Joy Sweeney 50 year old male is here today for   Chief Complaint   Patient presents with    Nasal Congestion     X 1 month       Last 4 weeks, runny nose, happens to him often with change in weather, claritin usually resolves within a few days, will be better with claritin, but symptoms recur whenever he stops    10 day sback had temp of 100.7 or so, and started coughing, and hasn't taken any medication, other than tylenol and ibuprofen, and continued claritin.    Cough is not resolving, but is ongoing    Had illness similar last fall, late summer,        PMH  Past Medical History[1]     PSH  Past Surgical History[2]     Social Hx:  No changes    ROS  See HPI    OBJECTIVE:  /86   Pulse 77   Temp 96.7 °F (35.9 °C) (Oral)   Resp 16   Ht 5' 6\" (1.676 m)   Wt 176 lb (79.8 kg)   SpO2 98%   BMI 28.41 kg/m²     Exam  Gen: No acute distress, alert and oriented x3, no focal neurologic deficits  ENT: PERRLA, EOMI, TM clear  CV: RRR, s1 and s2 present, no murmurs clicks or rubs  Resp: clear to auscultation bilaterally  Abd: BS+, no organomegaly or palpable abnormality  Ext:No edema, distal pulses intact upper and lower bilaterally  Skin:no rashes or lesions      Labs:          Meds:   Current Medications[3]      Assessment/Plan  Joy was seen today for nasal congestion.    Diagnoses and all orders for this visit:    Acute non-recurrent frontal sinusitis  -     amoxicillin clavulanate 875-125 MG Oral Tab; Take 1 tablet by mouth 2 (two) times daily for 10 days.  -     predniSONE 20 MG Oral Tab; Take 2 tablets (40 mg total) by mouth daily for 5 days.         Will treat as sinusitis likely started as allergic rhinitis, if not improving to let me know         Total Time spent with patient and coordinating care:  15 minutes.    Follow up: as needed      Arian Agosto MD         [1]   Past Medical History:   Inguinal pain    Low vitamin D level    Pain of testes    [2] History reviewed. No pertinent surgical history.  [3]   Current Outpatient Medications   Medication Sig Dispense Refill    amoxicillin clavulanate 875-125 MG Oral Tab Take 1 tablet by mouth 2 (two) times daily for 10 days. 20 tablet 0    predniSONE 20 MG Oral Tab Take 2 tablets (40 mg total) by mouth daily for 5 days. 10 tablet 0    clobetasol 0.05 % External Ointment Apply to AA's of feet BID x 3 weeks then twice a week as maintenance. Re-peat PRN. 120 g 3    loratadine 10 MG Oral Tab Take 1 tablet (10 mg total) by mouth as needed for Allergies.

## 2025-06-23 NOTE — PROGRESS NOTES
[facility] Progress Note    SUBJECTIVE: Joy Sweeney 50 year old male is here today for   Chief Complaint   Patient presents with    Cough     Is better than before, but his throat gets itchy and then he stars coughing. He occasionally coughs up yellow phlegm        Still feeling an itchy throat, and when triggered will cough for a few minutes, is '75% better'    Sometimes the phlegm comes but small amount    PMH  Past Medical History[1]     PSH  Past Surgical History[2]     Social Hx:  No changes    ROS  See HPI    OBJECTIVE:  /88   Pulse 86   Temp 98.1 °F (36.7 °C) (Oral)   Resp 16   Ht 5' 6\" (1.676 m)   Wt 177 lb (80.3 kg)   SpO2 99%   BMI 28.57 kg/m²     Exam  Gen: No acute distress, alert and oriented x3, no focal neurologic deficits  ENT: PERRLA, EOMI, TM clear  CV: RRR, s1 and s2 present, no murmurs clicks or rubs  Resp: clear to auscultation bilaterally        Labs:          Meds:   Current Medications[3]      Assessment/Plan  Joy was seen today for cough.    Diagnoses and all orders for this visit:    Persistent cough for 3 weeks or longer  -     azithromycin 250 MG Oral Tab; Take 2 tablets (500 mg total) by mouth daily for 1 day, THEN 1 tablet (250 mg total) daily for 4 days.  -     XR CHEST PA + LAT CHEST (CPT=71046); Future  -     C-Reactive Protein [E]; Future  -     CBC W Differential W Platelet [E]; Future         Will plan on labs, and xray, and second abx course due to non resolution of symptoms, if not improving consider PFTs as wll         Total Time spent with patient and coordinating care:  15 minutes.    Follow up: as needed      Arian Agosto MD         [1]   Past Medical History:   Inguinal pain    Low vitamin D level    Pain of testes   [2] History reviewed. No pertinent surgical history.  [3]   Current Outpatient Medications   Medication Sig Dispense Refill    azithromycin 250 MG Oral Tab Take 2 tablets (500 mg total) by mouth daily for 1 day, THEN 1 tablet (250  mg total) daily for 4 days. 6 tablet 0    clobetasol 0.05 % External Ointment Apply to AA's of feet BID x 3 weeks then twice a week as maintenance. Re-peat PRN. 120 g 3    loratadine 10 MG Oral Tab Take 1 tablet (10 mg total) by mouth as needed for Allergies.

## (undated) NOTE — Clinical Note
03/23/2017        S[address]  [address]      Dear Dana Young,    Our records indicate that you have outstanding lab work and or testing that was ordered for you and has not yet been completed:      TSH+FREE T4  CBC, Platelet

## (undated) NOTE — MR AVS SNAPSHOT
[address] 700 [facility]  [facility] [address]  [phone]               Thank you for choosing us for your health care visit with Shandra Jara MD.  We are glad to serve you and happy to provide you wit Instructions and Information about Your Health     None      Allergies as of Mar 28, 2017     No Known Allergies                Today's Vital Signs     BP Pulse Temp Weight          120/60 mmHg 84 97.8 °F (36.6 °C) (Oral) 173 lb           Current Medicatio

## (undated) NOTE — LETTER
[facility] [place]  [address] [facility] [address]  Dept: [phone]  Dept Fax: [phone]         January 20, 2025    Patient: Joy Sweeney   YOB: 1975   Date of Visit: 1/20/2025       To Whom It May Concern:    Joy Sweeney was seen and treated in our Immediate Care department on 1/20/2025. He may return to work when fever free for 24 hours.    If you have any questions or concerns, please don't hesitate to call.      Encounter Provider(s):    Genesis Correa MD     3:37 PM  1/20/2025

## (undated) NOTE — MR AVS SNAPSHOT
[facility] [address] [place] [address] 700 [facility]  [place] [address] [id number] [phone]               Thank you for choosing us for your health care visit with Jessica Kuo MD.  We are glad to serve you and happy to provide you wit Instructions: To schedule an appointment for your radiology test please call [facility] Scheduling at [phone].          Referral Details     Referred By    Referred To    Hair Mathew MD   [address] [place], [place] [address] SHANE HAHN Albuterol Sulfate 108 (90 Base) MCG/ACT Aepb   Inhale 2 puffs into the lungs every 4 to 6 hours as needed. Clobetasol Prop Emollient Base 0.05 % Crea   Apply to area of rash twice a day for 2 weeks.    Commonly known as:  CLOBETASOL PROPIONATE E Eat plenty of protein, keep the fat content low Sugars:  sodas and sports drinks, candies and desserts   Eat plenty of low-fat dairy products High fat meats and dairy   Choose whole grain products Foods high in sodium   Water is best for hydration Fast selene

## (undated) NOTE — LETTER
Date & Time: 11/15/2023, 5:49 PM  Patient: Nellie Thompson  Encounter Provider(s):    Sharee Patrick MD       To Whom It May Concern:    Nellie Thompson was seen and treated in our department on 11/15/2023. He should not return to work until 11/20/23 .     If you have any questions or concerns, please do not hesitate to call.        _____________________________  Physician/APC Signature

## (undated) NOTE — MR AVS SNAPSHOT
[facility] [address] [place] [address] 700 [facility]  [place] [address] [address] [phone]               Thank you for choosing us for your health care visit with Ron Ferro MD.  We are glad to serve you and happy to provide you wit BP Pulse Temp Height Weight BMI    118/62 mmHg 96 97.8 °F (36.6 °C) (Oral) 66\" 173 lb 27.94 kg/m2         Current Medications          This list is accurate as of: 3/10/17  3:06 PM.  Always use your most recent med list.                Fluticasone Propio

## (undated) NOTE — MR AVS SNAPSHOT
[facility] [address] [place] [address] 700 [facility]  [place] [address] [address] [phone]               Thank you for choosing us for your health care visit with Jarrell Enciso MD.  We are glad to serve you and happy to provide you wit [email or web address]. If you've recently had a stay at the Hospital you can access your discharge instructions in [employer] by going to Visits < Admission Summaries.  If you've been to the Emergency Department or your doctor's office, you can view yo